# Patient Record
Sex: MALE | Race: WHITE | Employment: FULL TIME | URBAN - METROPOLITAN AREA
[De-identification: names, ages, dates, MRNs, and addresses within clinical notes are randomized per-mention and may not be internally consistent; named-entity substitution may affect disease eponyms.]

---

## 2021-01-19 ENCOUNTER — HOSPITAL ENCOUNTER (EMERGENCY)
Age: 46
Discharge: HOME | End: 2021-01-19
Attending: EMERGENCY MEDICINE
Payer: COMMERCIAL

## 2021-01-19 VITALS
BODY MASS INDEX: 47.74 KG/M2 | WEIGHT: 315 LBS | RESPIRATION RATE: 18 BRPM | SYSTOLIC BLOOD PRESSURE: 195 MMHG | OXYGEN SATURATION: 100 % | DIASTOLIC BLOOD PRESSURE: 97 MMHG | HEART RATE: 89 BPM | TEMPERATURE: 97.3 F | HEIGHT: 68 IN

## 2021-01-19 DIAGNOSIS — K43.9 HERNIA OF ANTERIOR ABDOMINAL WALL: Primary | ICD-10-CM

## 2021-01-19 PROCEDURE — 99282 EMERGENCY DEPT VISIT SF MDM: CPT

## 2021-01-19 RX ORDER — OXYCODONE AND ACETAMINOPHEN 5; 325 MG/1; MG/1
2 TABLET ORAL
Status: DISCONTINUED | OUTPATIENT
Start: 2021-01-19 | End: 2021-01-19

## 2021-01-20 NOTE — ED PROVIDER NOTES
EMERGENCY DEPARTMENT HISTORY AND PHYSICAL EXAM     ------------------------------------------------------------------------------------------------------  Please note that this dictation was completed with [employer], the [employer] voice recognition software. Quite often unanticipated grammatical, syntax, homophones, and other interpretive errors are inadvertently transcribed by the computer software. Please disregard these errors. Please excuse any errors that have escaped final proofreading.  -----------------------------------------------------------------------------------------------------------------    Date: 1/19/2021  Patient Name: Franklin Ocampo    History of Presenting Illness     Chief Complaint   Patient presents with    Abdominal Pain     hernia is out on the left side, states that he tried to put it back with out success at home and is nowe in significant pain       History Provided By: Patient    HPI: Franklin Ocampo is a 39 y.o. male, with significant pmhx of HTN and previous surgical incision hernia, who presents via private vehicle to the ED with c/o LLQ pain from large previously known hernia from a previous surgical incision. Notes having increased pain since 4pm, denies excessive lifting of heavy objects. Notes the hernia comes in and out often and he can usually push it back in himself but notes a \"golf ball\" sized area that was hard and painful. Notes having normal BM earlier in the day and no n/v since 4pm.  Did not take any pain medication at home for sx. Pt also specifically denies any recent fevers, chills, CP, SOB, nausea, vomiting, diarrhea, changes in BM, urinary sxs, or headache. PCP: Kaiden Figueroa MD    Social Hx: denies tobacco, denies EtOH, denies Illicit Drugs     There are no other complaints, changes, or physical findings at this time.      No Known Allergies      Current Outpatient Medications   Medication Sig Dispense Refill    methylphenidate (RITALIN) 10 mg tablet Take  by mouth. Take two 10mg tablets a day.  diazepam (VALIUM) 5 mg tablet Take 1 Tab by mouth every eight (8) hours as needed (spasm). 20 Tab 0    trimethoprim-sulfamethoxazole (BACTRIM DS) 160-800 mg per tablet Take 2 Tabs by mouth two (2) times a day. 40 Tab 0    oxyCODONE-acetaminophen (PERCOCET) 5-325 mg per tablet Take 1 Tab by mouth every four (4) hours as needed for Pain. 30 Tab 0    HYDROcodone-acetaminophen (NORCO) 5-325 mg per tablet Take 1-2 Tabs by mouth every four (4) hours as needed for Pain. 40 Tab 0    ESCITALOPRAM OXALATE (LEXAPRO PO) Take 30 mg by mouth daily.  omeprazole (PRILOSEC) 20 mg capsule Take 20 mg by mouth daily.  LISINOPRIL PO Take 10 mg by mouth daily. Past History     Past Medical History:  Past Medical History:   Diagnosis Date    Anxiety     Depression     Dyspepsia and other specified disorders of function of stomach     GERD (gastroesophageal reflux disease)     Hypertension     Psychiatric disorder     social anxiety and depression    Psychotic disorder ([facility] [address]     Sleep apnea     Weight loss     w/loss of appetite, fatigue, indigestion & diarrhea       Past Surgical History:  Past Surgical History:   Procedure Laterality Date    HX CHOLECYSTECTOMY  4/26/12    LAPAROSCOPIC, POSSIBLE OPEN, CHOLECYSTECTOMY AND CHOLANGIOGRAM with supervision/interpretation of radiology    HX HERNIA REPAIR         Family History:  Family History   Problem Relation Age of Onset    Hypertension Mother     Hypertension Father     Cancer Maternal Grandmother     Cancer Maternal Grandfather     Cancer Paternal Grandmother        Social History:  Social History     Tobacco Use    Smoking status: Never Smoker    Smokeless tobacco: Never Used   Substance Use Topics    Alcohol use: Yes     Comment: rarely    Drug use: No       Allergies:  No Known Allergies      Review of Systems   Review of Systems   Constitutional: Negative for chills and fever.    HENT: Negative. Eyes: Negative. Respiratory: Negative for cough, chest tightness and shortness of breath. Cardiovascular: Negative for chest pain and leg swelling. Gastrointestinal: Positive for abdominal pain. Negative for diarrhea, nausea and vomiting. Endocrine: Negative. Genitourinary: Negative for difficulty urinating and dysuria. Musculoskeletal: Negative for myalgias. Skin: Negative. Neurological: Negative. Psychiatric/Behavioral: Negative. All other systems reviewed and are negative. Physical Exam   Physical Exam  Abdominal:      General: Bowel sounds are normal.      Tenderness: There is abdominal tenderness in the left lower quadrant. Hernia: A hernia is present. Hernia is present in the ventral area. Diagnostic Study Results     Labs -   No results found for this or any previous visit (from the past 12 hour(s)). Radiologic Studies -   No orders to display     CT Results  (Last 48 hours)    None        CXR Results  (Last 48 hours)    None            Medical Decision Making   I am the first provider for this patient. I reviewed the vital signs, available nursing notes, past medical history, past surgical history, family history and social history. Vital Signs-Reviewed the patient's vital signs. Patient Vitals for the past 12 hrs:   Temp Pulse Resp BP SpO2   01/19/21 2039 97.3 °F (36.3 °C) 89 18 (!) 195/97 100 %       Pulse Oximetry Analysis - 100% on RA normal    Records Reviewed/Interpretted: Nursing Notes from triage and Old Medical Records, noting previous general surgery visits for known recurrent ventral incisional hernia    Provider Notes (Medical Decision Making):     DDX:  Reducible vs incarcerated hernia    Plan:  Attempted manual reduction    Impression:  Reducible recurrent hernia    ED Course:   Initial assessment performed.  The patients presenting problems have been discussed, and they are in agreement with the care plan formulated and outlined with them. I have encouraged them to ask questions as they arise throughout their visit. I reviewed our electronic medical record system for any past medical records that were available that may contribute to the patients current condition, the nursing notes and and vital signs from today's visit  Nursing notes will be reviewed as they become available in realtime while the pt has been in the ED. Sabrina Pozo MD    Procedure Note - Hernia Reduction:   9:40 PM  Performed by: Sabrina Pozo MD  Verbal consent obtained. Hernia to LLQ. Pt placed in the supine, trendelenburg  position. Hernia  was successfully reduced. No blood loss. No specimens removed. Number of attempts: 1    The procedure took 1-15 minutes, and pt tolerated well. 9:40 PM    Progress note:  Pt noted to be feeling better, ready for discharge. Pt will follow up with general surgery as instructed. All questions have been answered, pt voiced understanding and agreement with plan. Specific return precautions provided in addition to instructions for pt to return to the ED immediately should sx worsen at any time. Sabrina Pozo MD             Critical Care Time:     none      Diagnosis     Clinical Impression:   1. Hernia of anterior abdominal wall        PLAN:  1. Current Discharge Medication List        2.    Follow-up Information     Follow up With Specialties Details Why Contact Info    Jamie Mansfield MD Internal Medicine Schedule an appointment as soon as possible for a visit  As needed [address] [facility] [place] [address]      Olga Millard MD General Surgery, Breast Surgery, Endocrinology, Colon and Rectal Surgery In 2 days  [address]  [phone]      [facility] EMERGENCY DEPT Emergency Medicine  As needed, If symptoms worsen [address]  [phone]        Return to ED if worse Disposition:    9:41 PM   The patient's results have been reviewed with family and/or caregiver. They verbally convey their understanding and agreement of the patient's signs, symptoms, diagnosis, treatment and prognosis and additionally agree to follow up as recommended in the discharge instructions or to return to the Emergency Room should the patient's condition change prior to their follow-up appointment. The family and/or caregiver verbally agrees with the care-plan and all of their questions have been answered. The discharge instructions have also been provided to the them with educational information regarding the patient's diagnosis as well a list of reasons why the patient would want to return to the ER prior to their follow-up appointment should their condition change.   Sheryle Keepers, MD

## 2021-01-20 NOTE — ED NOTES
Pt discharged by [facility]. Pt provided with discharge instructions RX and instructions on follow up care. Pt out of ED with no apparent difficulty accompanied by RN.

## 2021-06-14 ENCOUNTER — HOSPITAL ENCOUNTER (EMERGENCY)
Age: 46
Discharge: HOME | End: 2021-06-14
Attending: EMERGENCY MEDICINE
Payer: COMMERCIAL

## 2021-06-14 ENCOUNTER — APPOINTMENT (OUTPATIENT)
Dept: CT IMAGING | Age: 46
End: 2021-06-14
Attending: EMERGENCY MEDICINE
Payer: COMMERCIAL

## 2021-06-14 VITALS
SYSTOLIC BLOOD PRESSURE: 127 MMHG | TEMPERATURE: 98.6 F | HEIGHT: 68 IN | BODY MASS INDEX: 47.74 KG/M2 | DIASTOLIC BLOOD PRESSURE: 75 MMHG | WEIGHT: 315 LBS | OXYGEN SATURATION: 96 % | HEART RATE: 84 BPM | RESPIRATION RATE: 20 BRPM

## 2021-06-14 DIAGNOSIS — K43.9 VENTRAL HERNIA WITHOUT OBSTRUCTION OR GANGRENE: Primary | ICD-10-CM

## 2021-06-14 LAB
ALBUMIN SERPL-MCNC: 3.4 G/DL (ref 3.5–5)
ALBUMIN/GLOB SERPL: 0.7 {RATIO} (ref 1.1–2.2)
ALP SERPL-CCNC: 74 U/L (ref 45–117)
ALT SERPL-CCNC: 37 U/L (ref 12–78)
ANION GAP SERPL CALC-SCNC: 5 MMOL/L (ref 5–15)
AST SERPL-CCNC: 16 U/L (ref 15–37)
BASOPHILS # BLD: 0 K/UL (ref 0–0.1)
BASOPHILS NFR BLD: 0 % (ref 0–1)
BILIRUB SERPL-MCNC: 0.4 MG/DL (ref 0.2–1)
BUN SERPL-MCNC: 11 MG/DL (ref 6–20)
BUN/CREAT SERPL: 10 (ref 12–20)
CALCIUM SERPL-MCNC: 9.2 MG/DL (ref 8.5–10.1)
CHLORIDE SERPL-SCNC: 104 MMOL/L (ref 97–108)
CO2 SERPL-SCNC: 27 MMOL/L (ref 21–32)
CREAT SERPL-MCNC: 1.12 MG/DL (ref 0.7–1.3)
DIFFERENTIAL METHOD BLD: ABNORMAL
EOSINOPHIL # BLD: 0.1 K/UL (ref 0–0.4)
EOSINOPHIL NFR BLD: 1 % (ref 0–7)
ERYTHROCYTE [DISTWIDTH] IN BLOOD BY AUTOMATED COUNT: 14.2 % (ref 11.5–14.5)
GLOBULIN SER CALC-MCNC: 4.7 G/DL (ref 2–4)
GLUCOSE SERPL-MCNC: 109 MG/DL (ref 65–100)
HCT VFR BLD AUTO: 42.2 % (ref 36.6–50.3)
HGB BLD-MCNC: 13.8 G/DL (ref 12.1–17)
IMM GRANULOCYTES # BLD AUTO: 0.1 K/UL (ref 0–0.04)
IMM GRANULOCYTES NFR BLD AUTO: 1 % (ref 0–0.5)
LACTATE BLD-SCNC: 1.6 MMOL/L (ref 0.4–2)
LACTATE BLD-SCNC: 2.27 MMOL/L (ref 0.4–2)
LYMPHOCYTES # BLD: 2.9 K/UL (ref 0.8–3.5)
LYMPHOCYTES NFR BLD: 22 % (ref 12–49)
MCH RBC QN AUTO: 27.2 PG (ref 26–34)
MCHC RBC AUTO-ENTMCNC: 32.7 G/DL (ref 30–36.5)
MCV RBC AUTO: 83.2 FL (ref 80–99)
MONOCYTES # BLD: 1 K/UL (ref 0–1)
MONOCYTES NFR BLD: 7 % (ref 5–13)
NEUTS SEG # BLD: 9.5 K/UL (ref 1.8–8)
NEUTS SEG NFR BLD: 69 % (ref 32–75)
NRBC # BLD: 0 K/UL (ref 0–0.01)
NRBC BLD-RTO: 0 PER 100 WBC
PLATELET # BLD AUTO: 382 K/UL (ref 150–400)
PMV BLD AUTO: 9.5 FL (ref 8.9–12.9)
POTASSIUM SERPL-SCNC: 4.1 MMOL/L (ref 3.5–5.1)
PROT SERPL-MCNC: 8.1 G/DL (ref 6.4–8.2)
RBC # BLD AUTO: 5.07 M/UL (ref 4.1–5.7)
SODIUM SERPL-SCNC: 136 MMOL/L (ref 136–145)
WBC # BLD AUTO: 13.5 K/UL (ref 4.1–11.1)

## 2021-06-14 PROCEDURE — 36415 COLL VENOUS BLD VENIPUNCTURE: CPT

## 2021-06-14 PROCEDURE — 74011000258 HC RX REV CODE- 258: Performed by: EMERGENCY MEDICINE

## 2021-06-14 PROCEDURE — 74011250636 HC RX REV CODE- 250/636: Performed by: EMERGENCY MEDICINE

## 2021-06-14 PROCEDURE — 96361 HYDRATE IV INFUSION ADD-ON: CPT

## 2021-06-14 PROCEDURE — 96365 THER/PROPH/DIAG IV INF INIT: CPT

## 2021-06-14 PROCEDURE — 85025 COMPLETE CBC W/AUTO DIFF WBC: CPT

## 2021-06-14 PROCEDURE — 80053 COMPREHEN METABOLIC PANEL: CPT

## 2021-06-14 PROCEDURE — 83605 ASSAY OF LACTIC ACID: CPT

## 2021-06-14 PROCEDURE — 74177 CT ABD & PELVIS W/CONTRAST: CPT

## 2021-06-14 PROCEDURE — 87040 BLOOD CULTURE FOR BACTERIA: CPT

## 2021-06-14 PROCEDURE — 74011000636 HC RX REV CODE- 636: Performed by: EMERGENCY MEDICINE

## 2021-06-14 PROCEDURE — 99284 EMERGENCY DEPT VISIT MOD MDM: CPT

## 2021-06-14 PROCEDURE — 99203 OFFICE O/P NEW LOW 30 MIN: CPT | Performed by: SURGERY

## 2021-06-14 RX ADMIN — PIPERACILLIN AND TAZOBACTAM 3.38 G: 3; .375 INJECTION, POWDER, LYOPHILIZED, FOR SOLUTION INTRAVENOUS at 18:56

## 2021-06-14 RX ADMIN — SODIUM CHLORIDE 1000 ML: 9 INJECTION, SOLUTION INTRAVENOUS at 19:00

## 2021-06-14 RX ADMIN — IOPAMIDOL 100 ML: 755 INJECTION, SOLUTION INTRAVENOUS at 18:47

## 2021-06-14 RX ADMIN — IOHEXOL 50 ML: 240 INJECTION, SOLUTION INTRATHECAL; INTRAVASCULAR; INTRAVENOUS; ORAL at 17:01

## 2021-06-14 NOTE — ED PROVIDER NOTES
EMERGENCY DEPARTMENT HISTORY AND PHYSICAL EXAM      Date: 6/14/2021  Patient Name: Jeff Lake  Patient Age and Sex: 39 y.o. male     History of Presenting Illness     Chief Complaint   Patient presents with    Abdominal Pain     pt with known hernia in LLQ complains of tenderness to palpation and heat in that area as well as not wanting to eat due to it increasing pain x 3 days. pcp sent him over with concerns for incarceration/strangulation of hernia       History Provided By: Patient    HPI: Jeff Lake is a 79-year-old male with a history of ventral hernia presenting for abdominal pain. Patient states that he has been dealing with this ventral hernia for many years now, however 3 days ago started having terrible pain at his hernia site. Today it got so bad that it is very very sensitive to just light touch. Associated with some diarrhea but denies any constipation, nausea or vomiting. Had a fever of 101. Went to see his primary care doctor who sent him here. There are no other complaints, changes, or physical findings at this time. PCP: Shay Mcarthur MD    No current facility-administered medications on file prior to encounter. Current Outpatient Medications on File Prior to Encounter   Medication Sig Dispense Refill    methylphenidate (RITALIN) 10 mg tablet Take  by mouth. Take two 10mg tablets a day.  diazepam (VALIUM) 5 mg tablet Take 1 Tab by mouth every eight (8) hours as needed (spasm). 20 Tab 0    trimethoprim-sulfamethoxazole (BACTRIM DS) 160-800 mg per tablet Take 2 Tabs by mouth two (2) times a day. 40 Tab 0    oxyCODONE-acetaminophen (PERCOCET) 5-325 mg per tablet Take 1 Tab by mouth every four (4) hours as needed for Pain. 30 Tab 0    HYDROcodone-acetaminophen (NORCO) 5-325 mg per tablet Take 1-2 Tabs by mouth every four (4) hours as needed for Pain. 40 Tab 0    ESCITALOPRAM OXALATE (LEXAPRO PO) Take 30 mg by mouth daily.       omeprazole (PRILOSEC) 20 mg capsule Take 20 mg by mouth daily.  LISINOPRIL PO Take 10 mg by mouth daily. Past History     Past Medical History:  Past Medical History:   Diagnosis Date    Anxiety     Depression     Dyspepsia and other specified disorders of function of stomach     GERD (gastroesophageal reflux disease)     Hypertension     Psychiatric disorder     social anxiety and depression    Psychotic disorder ([facility] [address]     Sleep apnea     Weight loss     w/loss of appetite, fatigue, indigestion & diarrhea       Past Surgical History:  Past Surgical History:   Procedure Laterality Date    HX CHOLECYSTECTOMY  4/26/12    LAPAROSCOPIC, POSSIBLE OPEN, CHOLECYSTECTOMY AND CHOLANGIOGRAM with supervision/interpretation of radiology    HX HERNIA REPAIR         Family History:  Family History   Problem Relation Age of Onset    Hypertension Mother     Hypertension Father     Cancer Maternal Grandmother     Cancer Maternal Grandfather     Cancer Paternal Grandmother        Social History:  Social History     Tobacco Use    Smoking status: Never Smoker    Smokeless tobacco: Never Used   Substance Use Topics    Alcohol use: Yes     Comment: rarely    Drug use: No       Allergies:  No Known Allergies      Review of Systems   Review of Systems   Constitutional: Positive for fever. Negative for chills. Respiratory: Negative for cough and shortness of breath. Cardiovascular: Negative for chest pain. Gastrointestinal: Positive for abdominal pain and diarrhea. Negative for constipation, nausea and vomiting. Genitourinary: Negative for dysuria, frequency and hematuria. Neurological: Negative for weakness and numbness. All other systems reviewed and are negative. Physical Exam   Physical Exam  Vitals and nursing note reviewed. Constitutional:       Appearance: He is well-developed. HENT:      Head: Normocephalic and atraumatic.       Nose: Nose normal.      Mouth/Throat:      Mouth: Mucous membranes are moist. Eyes:      Extraocular Movements: Extraocular movements intact. Conjunctiva/sclera: Conjunctivae normal.   Cardiovascular:      Rate and Rhythm: Normal rate and regular rhythm. Pulmonary:      Effort: Pulmonary effort is normal. No respiratory distress. Breath sounds: Normal breath sounds. Abdominal:      General: There is no distension. Palpations: Abdomen is soft. Tenderness: There is abdominal tenderness. Hernia: A hernia is present. Hernia is present in the ventral area. Comments: Patient has a significant left-sided ventral hernia that is exquisitely tender. Not able to be reduced. Musculoskeletal:         General: Normal range of motion. Cervical back: Normal range of motion and neck supple. Skin:     General: Skin is warm and dry. Neurological:      General: No focal deficit present. Mental Status: He is alert and oriented to person, place, and time. Mental status is at baseline. Psychiatric:         Mood and Affect: Mood normal.          Diagnostic Study Results     Labs -     No results found for this or any previous visit (from the past 12 hour(s)). Radiologic Studies -   CT ABD PELV W CONT   Final Result    Large infraumbilical ventral hernia containing bowel. No bowel obstruction. No   evidence of incarceration. Other ventral hernias as noted above. Marked hepatic steatosis. .              CT Results  (Last 48 hours)               06/14/21 1847  CT ABD PELV W CONT Final result    Impression:   Large infraumbilical ventral hernia containing bowel. No bowel obstruction. No   evidence of incarceration. Other ventral hernias as noted above. Marked hepatic steatosis. .               Narrative:  EXAMINATION:  CT ABD PELV W CONT   INDICATION:  LLQ pain and hernia. Concern incarceration   COMPARISON: CT of 4/8/2012. CONTRAST: 100 mL of Isovue-370.        TECHNIQUE:    Multislice helical CT was performed from the diaphragm to the symphysis pubis during uneventful rapid bolus intravenous contrast administration. Oral contrast   was administered. Axial images were acquired. Lung and soft tissue windows were   generated. Coronal and sagittal reformations were generated. CT dose reduction   was achieved through use of a standardized protocol tailored for this   examination and automatic exposure control for dose modulation. FINDINGS:   LOWER CHEST: The visualized portions of the lung bases are clear. ABDOMEN:   Liver: Hepatic steatosis. No focal lesion. Gallbladder and bile ducts: Previous cholecystectomy. No biliary dilatation. Spleen: No abnormality. Pancreas: No abnormality. Adrenal glands: No abnormality. Kidneys: No abnormality. BOWEL AND MESENTERY: Stomach appears unremarkable. No small bowel dilatation or   wall thickening. No significant colonic abnormalities. No mesenteric mass or   adenopathy. Appendix is nondistended. PERITONEUM: No ascites or free intraperitoneal air. RETROPERITONEUM: Aorta  tapers without aneurysm. No retroperitoneal adenopathy   or mass. No pelvic mass or adenopathy. PELVIS:   Reproductive organs:  Unremarkable. Bladder: No abnormality. BONES AND SOFT TISSUES: Large infraumbilical broad-based ventral hernia with   separation of the rectus muscles and protrusion of multiple loops of small   bowel. No evidence of bowel obstruction. Smaller fat-containing midline   epigastric ventral hernias. Generalized diastasIs of the rectus muscles. Calcified central disc herniation L5-S1. CXR Results  (Last 48 hours)    None            Medical Decision Making   I am the first provider for this patient. I reviewed the vital signs, available nursing notes, past medical history, past surgical history, family history and social history. Vital Signs-Reviewed the patient's vital signs. No data found.     Records Reviewed: Nursing Notes and Old Medical Records    Provider Notes (Medical Decision Making):   Patient presenting with left ventral abdominal hernia with significant pain. Differential includes small bowel obstruction, incarcerated versus strangulated hernia, hernia uncomplicated. Will get labs, CT of the abdomen treat his symptoms. ED Course:   Initial assessment performed. The patients presenting problems have been discussed, and they are in agreement with the care plan formulated and outlined with them. I have encouraged them to ask questions as they arise throughout their visit. ED Course as of Madhav 15 0956   Mon Jun 14, 2021   1817 There was an error with the POC machine causing an erroneous lactate of 2.27. Repeated the lactate right away and it was 1.60. At this time patient does not meet criteria for severe sepsis. [JS]   2000 -------------------------Signout----------------------------  I took over care of this patient at [facility] MD Dr. Lolis Matamoros saw and evaluated the patient and plans to give the patient an abdominal binder. He is not concerned about incarcerated hernia or any severe infection. The patient will have the abdominal binder, and be discharged follow-up with Dr. Patricia James in the office. ----------------------------------------------------------------          [NW]      ED Course User Index  [JS] Sherita Bain MD  [NW] Leigh Garcia MD     Critical Care Time:   0      Disposition:    Discharge Note:  The patient has been re-evaluated and is ready for discharge. Reviewed available results with patient. Counseled patient on diagnosis and care plan. Patient has expressed understanding, and all questions have been answered. Patient agrees with plan and agrees to follow up as recommended, or to return to the ED if their symptoms worsen. Discharge instructions have been provided and explained to the patient, along with reasons to return to the ED.       PLAN:  Discharge Medication List as of 6/14/2021  8:47 PM      1.   2.   Follow-up Information     Follow up With Specialties Details Why Contact Info    Kristie Garcia MD General Surgery, Breast Surgery, Endocrinology, Colon and Rectal Surgery   [address] [address] [address]  [phone]          3. Return to ED if worse   . Diagnosis     Clinical Impression:   1. Ventral hernia without obstruction or gangrene        Attestations:  Kendra Snyder M.D. Please note that this dictation was completed with [employer] the [employer] voice recognition software. Quite often unanticipated grammatical, syntax, homophones, and other interpretive errors are inadvertently transcribed by the computer software. Please disregard these errors. Please excuse any errors that have escaped final proofreading. Thank you.

## 2021-06-15 NOTE — CONSULTS
Surgery Consult    Subjective:      Thais Burgess is a 39 y.o. male who presents for evaluation of painful persistent left sided infraumbilical ventral incisional hernia. Pt saw his PCP today who directed him to the ED for further evaluation. Pt denies any nausea, vomiting, or change in bowel habits. He had a fever of 101 but is afebrile presently. Pt underwent laparoscopic cholecystectomy with Dr. Anitha Newell in 2012 and developed a ventral incisional hernia, s/p lap incisional hernia repair in Feb 2014 also by Dr. Anitha Newell. He saw him back in August, 2015 with recurrent hernias and was told to lose at least 25 pounds and f/u after for elective repair. He has not followed up and has gained more weight. He now has a BMI of 52.5 based on his stated weight. Past Medical History:   Diagnosis Date    Anxiety     Depression     Dyspepsia and other specified disorders of function of stomach     GERD (gastroesophageal reflux disease)     Hypertension     Psychiatric disorder     social anxiety and depression    Psychotic disorder ([address]     Sleep apnea     Weight loss     w/loss of appetite, fatigue, indigestion & diarrhea     Past Surgical History:   Procedure Laterality Date    HX CHOLECYSTECTOMY  4/26/12    LAPAROSCOPIC, POSSIBLE OPEN, CHOLECYSTECTOMY AND CHOLANGIOGRAM with supervision/interpretation of radiology    HX HERNIA REPAIR        Family History   Problem Relation Age of Onset    Hypertension Mother     Hypertension Father     Cancer Maternal Grandmother     Cancer Maternal Grandfather     Cancer Paternal Grandmother      Social History     Tobacco Use    Smoking status: Never Smoker    Smokeless tobacco: Never Used   Substance Use Topics    Alcohol use: Yes     Comment: rarely      Prior to Admission medications    Medication Sig Start Date End Date Taking? Authorizing Provider   methylphenidate (RITALIN) 10 mg tablet Take  by mouth. Take two 10mg tablets a day.     Provider, Historical   diazepam (VALIUM) 5 mg tablet Take 1 Tab by mouth every eight (8) hours as needed (spasm). 3/8/14   Geri Uriarte MD   trimethoprim-sulfamethoxazole (BACTRIM DS) 160-800 mg per tablet Take 2 Tabs by mouth two (2) times a day. 3/8/14   Geri Uriarte MD   oxyCODONE-acetaminophen (PERCOCET) 5-325 mg per tablet Take 1 Tab by mouth every four (4) hours as needed for Pain. 14   Romana Auguste MD   HYDROcodone-acetaminophen [facility]) 5-325 mg per tablet Take 1-2 Tabs by mouth every four (4) hours as needed for Pain. 14   Diann Boothe MD   ESCITALOPRAM OXALATE (LEXAPRO PO) Take 30 mg by mouth daily. Provider, Historical   omeprazole (PRILOSEC) 20 mg capsule Take 20 mg by mouth daily. Provider, Historical   LISINOPRIL PO Take 10 mg by mouth daily. Provider, Historical      No Known Allergies    Review of Systems   Constitutional: Positive for appetite change and fever. Negative for chills and diaphoresis. Respiratory: Negative for shortness of breath and wheezing. Cardiovascular: Negative for chest pain and palpitations. Gastrointestinal: Positive for abdominal pain. Negative for diarrhea, nausea and vomiting. Musculoskeletal: Negative for myalgias. Hematological: Does not bruise/bleed easily. All other systems reviewed and are negative. Objective:     Patient Vitals for the past 8 hrs:   BP Temp Pulse Resp SpO2 Height Weight   21 1945 127/75  84  97 %     21 1641 (!) 185/107 99.5 °F (37.5 °C) 100 20 94 % 5' 8\" (1.727 m) 345 lb 10.9 oz (156.8 kg)       Temp (24hrs), Av.5 °F (37.5 °C), Min:99.5 °F (37.5 °C), Max:99.5 °F (37.5 °C)      Physical Exam  Constitutional:       General: He is not in acute distress. Appearance: He is well-developed. He is obese. He is not toxic-appearing. HENT:      Head: Normocephalic and atraumatic. Eyes:      General: No scleral icterus. Pupils: Pupils are equal, round, and reactive to light. Cardiovascular:      Rate and Rhythm: Normal rate and regular rhythm. Heart sounds: Normal heart sounds. Pulmonary:      Breath sounds: Normal breath sounds. No wheezing or rales. Abdominal:      General: Bowel sounds are normal. There is no distension. Palpations: Abdomen is soft. There is no mass. Tenderness: There is abdominal tenderness in the left lower quadrant. There is no guarding or rebound. Hernia: A hernia is present. Hernia is present in the ventral area. Musculoskeletal:         General: Normal range of motion. Lymphadenopathy:      Cervical: No cervical adenopathy. Neurological:      General: No focal deficit present. Mental Status: He is alert and oriented to person, place, and time. Assessment:     38 yo super morbidly obese man with ventral hernia x several years, increasing in size and symptoms. Unsuccessful at losing weight and now at increased risk for strangulation and necessity for bowel resection. No emergent findings on CT or exam.  Bowel present in hernia which is wide mouthed without evidence of strangulation or ischemia. Plan:     Pt given abdominal binder. Instructed to use 24/7 and f/u with Dr. Dallas Haji in the office to schedule robotic ventral hernia repair with mesh.

## 2021-06-19 LAB
BACTERIA SPEC CULT: NORMAL
SERVICE CMNT-IMP: NORMAL

## 2021-06-21 ENCOUNTER — OFFICE VISIT (OUTPATIENT)
Dept: SURGERY | Age: 46
End: 2021-06-21
Payer: COMMERCIAL

## 2021-06-21 VITALS
WEIGHT: 315 LBS | BODY MASS INDEX: 47.74 KG/M2 | HEIGHT: 68 IN | HEART RATE: 62 BPM | SYSTOLIC BLOOD PRESSURE: 139 MMHG | TEMPERATURE: 98.1 F | DIASTOLIC BLOOD PRESSURE: 85 MMHG | RESPIRATION RATE: 20 BRPM | OXYGEN SATURATION: 100 %

## 2021-06-21 DIAGNOSIS — K43.2 RECURRENT INCISIONAL HERNIA: Primary | ICD-10-CM

## 2021-06-21 DIAGNOSIS — E66.01 MORBID OBESITY (HCC): Chronic | ICD-10-CM

## 2021-06-21 DIAGNOSIS — I10 ESSENTIAL HYPERTENSION: ICD-10-CM

## 2021-06-21 PROCEDURE — 99214 OFFICE O/P EST MOD 30 MIN: CPT | Performed by: SURGERY

## 2021-06-21 RX ORDER — ALLOPURINOL 100 MG/1
TABLET ORAL
COMMUNITY
Start: 2021-06-01

## 2021-06-21 RX ORDER — QUETIAPINE FUMARATE 25 MG/1
TABLET, FILM COATED ORAL
COMMUNITY
Start: 2021-04-30

## 2021-06-21 NOTE — PROGRESS NOTES
Meliton Anderson is a 39 y.o. male  HIPAA verified by two patient identifiers. Health Maintenance Due   Topic    Hepatitis C Screening     DTaP/Tdap/Td series (1 - Tdap)    Lipid Screen     COVID-19 Vaccine (2 - Pfizer 2-dose series)     Chief Complaint   Patient presents with    Possible Hernia     Seen at [facility] for eval of abd hernia      Visit Vitals  /85   Pulse 62   Temp 98.1 °F (36.7 °C) (Temporal)   Resp 20   Ht 5' 8\" (1.727 m)   Wt (!) 158.9 kg (350 lb 6.4 oz)   SpO2 100%   BMI 53.28 kg/m²       Pain Scale: 4/10  Pain Location: Abdomen (left side dull pain)    1. Have you been to the ER, urgent care clinic since your last visit? Hospitalized since your last visit? No    2. Have you seen or consulted any other health care providers outside of the [address] [place] [address] since your last visit? Include any pap smears or colon screening.  No

## 2021-06-21 NOTE — PROGRESS NOTES
HISTORY OF PRESENT ILLNESS  Jamilah Shaikh is a 39 y.o. male who comes in for consultation by Ricardo Carson MD and ER for a recurrent incisional hernia  HPI  He has gone to the ER twice in the last 3 months for abdominal pain related to a hernia. He has difficulty reducing it when he goes at times. In the ER it has been reducible. CTs noted multiple defects. He reports pain with it and diarrhea. He denies associated nausea, vomiting, constipation, melena, hematochezia. He previously had lap edson and an incisional hernia repair with mesh (2014 8 x 10 inch mesh). Past Medical History:   Diagnosis Date    Anxiety     Depression     Dyspepsia and other specified disorders of function of stomach     GERD (gastroesophageal reflux disease)     Hypertension     Psychiatric disorder     social anxiety and depression    Psychotic disorder ([address]     Sleep apnea     Weight loss     w/loss of appetite, fatigue, indigestion & diarrhea     Past Surgical History:   Procedure Laterality Date    HX CHOLECYSTECTOMY  4/26/12    LAPAROSCOPIC, POSSIBLE OPEN, CHOLECYSTECTOMY AND CHOLANGIOGRAM with supervision/interpretation of radiology    HX HERNIA REPAIR       Family History   Problem Relation Age of Onset    Hypertension Mother     Hypertension Father     Cancer Maternal Grandmother     Cancer Maternal Grandfather     Cancer Paternal Grandmother      Social History     Tobacco Use    Smoking status: Never Smoker    Smokeless tobacco: Never Used   Substance Use Topics    Alcohol use: Yes     Comment: rarely    Drug use: No     Current Outpatient Medications   Medication Sig    allopurinoL (ZYLOPRIM) 100 mg tablet TAKE 1 TABLET BY MOUTH EVERY DAY    QUEtiapine (SEROquel) 25 mg tablet     ESCITALOPRAM OXALATE (LEXAPRO PO) Take 30 mg by mouth daily.  omeprazole (PRILOSEC) 20 mg capsule Take 20 mg by mouth daily.  LISINOPRIL PO Take 10 mg by mouth daily.      No current facility-administered medications for this visit. No Known Allergies    Review of Systems   Constitutional: Negative for chills, diaphoresis, fever, malaise/fatigue and weight loss. HENT: Negative for congestion, ear pain and sore throat. Eyes: Negative for blurred vision and pain. Respiratory: Negative for cough, hemoptysis, sputum production, shortness of breath, wheezing and stridor. Cardiovascular: Negative for chest pain, palpitations, orthopnea, claudication, leg swelling and PND. Gastrointestinal: Positive for abdominal pain and diarrhea. Negative for blood in stool, constipation, heartburn, melena, nausea and vomiting. Genitourinary: Negative for dysuria, flank pain, frequency, hematuria and urgency. Musculoskeletal: Negative for back pain, joint pain, myalgias and neck pain. Skin: Negative for itching and rash. Neurological: Negative for dizziness, tremors, focal weakness, seizures, weakness and headaches. Endo/Heme/Allergies: Negative for polydipsia. Psychiatric/Behavioral: Positive for depression. Negative for memory loss. The patient is nervous/anxious. Visit Vitals  /85   Pulse 62   Temp 98.1 °F (36.7 °C) (Temporal)   Resp 20   Ht 5' 8\" (1.727 m)   Wt (!) 158.9 kg (350 lb 6.4 oz)   SpO2 100%   BMI 53.28 kg/m²       Physical Exam  Constitutional:       General: He is not in acute distress. Appearance: Normal appearance. He is well-developed. He is not diaphoretic. HENT:      Head: Normocephalic and atraumatic. Mouth/Throat:      Pharynx: No oropharyngeal exudate. Eyes:      General: No scleral icterus. Conjunctiva/sclera: Conjunctivae normal.      Pupils: Pupils are equal, round, and reactive to light. Neck:      Thyroid: No thyromegaly. Trachea: No tracheal deviation. Cardiovascular:      Rate and Rhythm: Normal rate and regular rhythm. Heart sounds: Normal heart sounds. No murmur heard. No friction rub. No gallop.     Pulmonary:      Effort: Pulmonary effort is normal. No respiratory distress. Breath sounds: Normal breath sounds. No stridor. No wheezing or rales. Abdominal:      General: Bowel sounds are normal. There is no distension. Palpations: Abdomen is soft. There is no mass. Tenderness: There is no abdominal tenderness. There is no guarding or rebound. Hernia: A hernia is present. Hernia is present in the ventral area (multiple reducible herniae). There is no hernia in the left inguinal area or right inguinal area. Genitourinary:     Penis: Circumcised. Testes: Normal. Cremasteric reflex is present. Musculoskeletal:         General: No tenderness. Normal range of motion. Cervical back: Normal range of motion and neck supple. Lymphadenopathy:      Cervical: No cervical adenopathy. Skin:     General: Skin is warm and dry. Findings: No erythema or rash. Neurological:      Mental Status: He is alert and oriented to person, place, and time. Cranial Nerves: No cranial nerve deficit. Coordination: Coordination normal.   Psychiatric:         Behavior: Behavior normal.         Thought Content: Thought content normal.         Judgment: Judgment normal.       I reviewed his CT images and he has a large diastasis and multiple defects    ASSESSMENT and PLAN  1. Recurrent incisional herniae and diastasis rectus. I explained about the anatomy and pathophysiology of hernias and the risk of incarceration and strangulation of the bowel. I explained about hernia repairs (open with and without mesh, and robotic assisted and laparoscopic with mesh). I explained the risks and benefits of repair including bleeding, infection, chronic pain, orchalgia, loss of testes, bowel or bladder injury, hernia recurrence, seroma, mesh infection requiring removal.  I explained it would be a six to eight week recuperation with no driving for 5 - 7 days, no lifting for six weeks. 2.  Morbid obesity.   Body mass index is 53.28 kg/m². Recommended diet modification and increased exercise  3. Anxiety/depression. Improved on rx  4. Essential hypertension. Stable on rx  5. Gout.   Asymptomatic on allopurinol    At this point he will need a recurrent incisional hernia repair with mesh and separation of components for the repair and has a high risk for failure given his body habitus    I have recommended losing 50 pounds (at least 27) prior to surgery  I have also suggested he consider weight loss surgery  I explained to him how to reduce the hernia and when he would need to go to the ER    He will RTC 3 months    Hector Wynn MD FACS

## 2021-06-21 NOTE — LETTER
6/21/2021    Patient: Mary Ray   YOB: 1975   Date of Visit: 6/21/2021     Crys Wray MD  [facility],[address] [place] [id number]  Via Fax: [phone]    Dear Crys Wray MD,      Thank you for referring Mr. Osiris Epps to  [facility]  for evaluation. My notes for this consultation are attached. If you have questions, please do not hesitate to call me. I look forward to following your patient along with you.       Sincerely,    Tabatha Tucker MD

## 2022-03-20 PROBLEM — I10 ESSENTIAL HYPERTENSION: Status: ACTIVE | Noted: 2021-06-21

## 2023-05-16 RX ORDER — QUETIAPINE FUMARATE 25 MG/1
TABLET, FILM COATED ORAL
COMMUNITY
Start: 2021-04-30

## 2023-05-16 RX ORDER — ALLOPURINOL 100 MG/1
1 TABLET ORAL DAILY
COMMUNITY
Start: 2021-06-01

## 2023-05-16 RX ORDER — OMEPRAZOLE 20 MG/1
20 CAPSULE, DELAYED RELEASE ORAL DAILY
COMMUNITY

## 2025-04-29 ENCOUNTER — TRANSCRIBE ORDERS (OUTPATIENT)
Facility: HOSPITAL | Age: 50
End: 2025-04-29

## 2025-04-29 DIAGNOSIS — R12 HEARTBURN: Primary | ICD-10-CM

## 2025-04-29 DIAGNOSIS — K21.9 GASTROESOPHAGEAL REFLUX DISEASE, UNSPECIFIED WHETHER ESOPHAGITIS PRESENT: ICD-10-CM

## 2025-04-29 DIAGNOSIS — K46.9 ABDOMINAL HERNIA WITHOUT OBSTRUCTION AND WITHOUT GANGRENE, RECURRENCE NOT SPECIFIED, UNSPECIFIED HERNIA TYPE: ICD-10-CM

## 2025-04-29 DIAGNOSIS — Z12.11 COLON CANCER SCREENING: ICD-10-CM

## 2025-05-14 ENCOUNTER — HOSPITAL ENCOUNTER (OUTPATIENT)
Facility: HOSPITAL | Age: 50
Discharge: HOME | End: 2025-05-17
Payer: COMMERCIAL

## 2025-05-14 DIAGNOSIS — K46.9 ABDOMINAL HERNIA WITHOUT OBSTRUCTION AND WITHOUT GANGRENE, RECURRENCE NOT SPECIFIED, UNSPECIFIED HERNIA TYPE: ICD-10-CM

## 2025-05-14 DIAGNOSIS — K21.9 GASTROESOPHAGEAL REFLUX DISEASE, UNSPECIFIED WHETHER ESOPHAGITIS PRESENT: ICD-10-CM

## 2025-05-14 DIAGNOSIS — Z12.11 COLON CANCER SCREENING: ICD-10-CM

## 2025-05-14 DIAGNOSIS — R12 HEARTBURN: ICD-10-CM

## 2025-05-14 PROCEDURE — 6360000004 HC RX CONTRAST MEDICATION: Performed by: RADIOLOGY

## 2025-05-14 PROCEDURE — 74177 CT ABD & PELVIS W/CONTRAST: CPT

## 2025-05-14 RX ORDER — IOPAMIDOL 755 MG/ML
100 INJECTION, SOLUTION INTRAVASCULAR
Status: COMPLETED | OUTPATIENT
Start: 2025-05-14 | End: 2025-05-14

## 2025-05-14 RX ADMIN — IOPAMIDOL 100 ML: 755 INJECTION, SOLUTION INTRAVENOUS at 06:15

## 2025-05-21 ENCOUNTER — TELEPHONE (OUTPATIENT)
Age: 50
End: 2025-05-21

## 2025-05-21 ENCOUNTER — OFFICE VISIT (OUTPATIENT)
Age: 50
End: 2025-05-21
Payer: COMMERCIAL

## 2025-05-21 VITALS
HEIGHT: 68 IN | HEART RATE: 58 BPM | OXYGEN SATURATION: 96 % | RESPIRATION RATE: 20 BRPM | WEIGHT: 315 LBS | SYSTOLIC BLOOD PRESSURE: 140 MMHG | TEMPERATURE: 98.9 F | DIASTOLIC BLOOD PRESSURE: 100 MMHG | BODY MASS INDEX: 47.74 KG/M2

## 2025-05-21 DIAGNOSIS — K43.2 RECURRENT INCISIONAL HERNIA: Primary | ICD-10-CM

## 2025-05-21 DIAGNOSIS — E66.01 MORBID OBESITY (HCC): ICD-10-CM

## 2025-05-21 PROCEDURE — G8427 DOCREV CUR MEDS BY ELIG CLIN: HCPCS | Performed by: SURGERY

## 2025-05-21 PROCEDURE — 3077F SYST BP >= 140 MM HG: CPT | Performed by: SURGERY

## 2025-05-21 PROCEDURE — 99203 OFFICE O/P NEW LOW 30 MIN: CPT | Performed by: SURGERY

## 2025-05-21 PROCEDURE — 4004F PT TOBACCO SCREEN RCVD TLK: CPT | Performed by: SURGERY

## 2025-05-21 PROCEDURE — G8417 CALC BMI ABV UP PARAM F/U: HCPCS | Performed by: SURGERY

## 2025-05-21 PROCEDURE — 3080F DIAST BP >= 90 MM HG: CPT | Performed by: SURGERY

## 2025-05-21 ASSESSMENT — PATIENT HEALTH QUESTIONNAIRE - PHQ9
SUM OF ALL RESPONSES TO PHQ QUESTIONS 1-9: 0
1. LITTLE INTEREST OR PLEASURE IN DOING THINGS: NOT AT ALL
2. FEELING DOWN, DEPRESSED OR HOPELESS: NOT AT ALL
SUM OF ALL RESPONSES TO PHQ QUESTIONS 1-9: 0

## 2025-05-21 NOTE — TELEPHONE ENCOUNTER
Patient has been sent the link for our pre-recorded [facility] Medical Weight Loss Orientation. Patient is required to register, view the recording, call insurance to verify benefits, then send an email to the  to schedule a consultation.

## 2025-05-21 NOTE — PROGRESS NOTES
Identified patient with two patient identifiers (name and ). Reviewed chart in preparation for visit and have obtained necessary documentation.    Attila Ramírez is a 49 y.o. male  Chief Complaint   Patient presents with    New Patient     Abdominal hernia     BP (!) 140/100 Comment: manual checked twice  Pulse 58   Temp 98.9 °F (37.2 °C) (Oral)   Resp 20   Ht 1.727 m (5' 8\")   Wt (!) 165.6 kg (365 lb)   SpO2 96%   BMI 55.50 kg/m²     1. Have you been to the ER, urgent care clinic since your last visit?  Hospitalized since your last visit?yes -     2. Have you seen or consulted any other health care providers outside of the [facility] since your last visit?  Include any pap smears or colon screening. No    Patient and provider made aware of elevated BP x2. Patient asymptomatic. Patient reminded to monitor BP, continue to take BP medications if prescribed, and follow up with PCP/Cardiologist.  Patient expressed understanding and agreement.

## 2025-05-21 NOTE — PROGRESS NOTES
[facility] [place] Surgery History and Physical    History of Present Illness:      Attila Ramírez is a 49 y.o. male who has a past surgical history of laparoscopic cholecystectomy then had a large hernia near the umbilicus which was then repaired by Dr. Jin laparoscopically with an 8 x 10 inch mesh.  He has had a recurrence after the repair and has a large hernia in the midline and upper midline area.  The hernia has become much bigger over the last few years.  He does not have much true pain from the hernia but sometimes has some discomfort.  He does find it hard to get around and sit up and use his core muscles.  He has worked on some weight loss without much success.    Past Medical History:   Diagnosis Date    Anxiety     Depression     Dyspepsia and other specified disorders of function of stomach     GERD (gastroesophageal reflux disease)     Hypertension     Psychiatric disorder     social anxiety and depression    Psychotic disorder (HCC)     Sleep apnea     Weight loss     w/loss of appetite, fatigue, indigestion & diarrhea       Past Surgical History:   Procedure Laterality Date    CHOLECYSTECTOMY  4/26/12    LAPAROSCOPIC, POSSIBLE OPEN, CHOLECYSTECTOMY AND CHOLANGIOGRAM with supervision/interpretation of radiology    HERNIA REPAIR           Current Outpatient Medications:     LISINOPRIL PO, Take 10 mg by mouth daily, Disp: , Rfl:     allopurinol (ZYLOPRIM) 100 MG tablet, Take 1 tablet by mouth daily, Disp: , Rfl:     omeprazole (PRILOSEC) 20 MG delayed release capsule, Take 1 capsule by mouth daily, Disp: , Rfl:     QUEtiapine (SEROQUEL) 25 MG tablet, ceived the following from [facility]: Outside name: QUEtiapine (SEROquel) 25 mg tablet, Disp: , Rfl:     No Known Allergies    Social History     Socioeconomic History    Marital status: Single     Spouse name: Not on file    Number of children: Not on file    Years of education: Not on file    Highest

## 2025-07-07 ENCOUNTER — HOSPITAL ENCOUNTER (OUTPATIENT)
Facility: HOSPITAL | Age: 50
Discharge: HOME | End: 2025-07-07
Attending: SURGERY
Payer: COMMERCIAL

## 2025-07-07 VITALS — TEMPERATURE: 99.8 F | HEART RATE: 52 BPM | RESPIRATION RATE: 18 BRPM

## 2025-07-07 DIAGNOSIS — S31.109A OPEN WOUND OF ABDOMEN, INITIAL ENCOUNTER: Primary | ICD-10-CM

## 2025-07-07 PROCEDURE — 99213 OFFICE O/P EST LOW 20 MIN: CPT

## 2025-07-07 PROCEDURE — 99214 OFFICE O/P EST MOD 30 MIN: CPT | Performed by: SURGERY

## 2025-07-07 RX ORDER — NEOMYCIN/BACITRACIN/POLYMYXINB 3.5-400-5K
OINTMENT (GRAM) TOPICAL PRN
Status: CANCELLED | OUTPATIENT
Start: 2025-07-07

## 2025-07-07 RX ORDER — GINSENG 100 MG
CAPSULE ORAL PRN
OUTPATIENT
Start: 2025-07-07

## 2025-07-07 RX ORDER — LIDOCAINE 40 MG/G
CREAM TOPICAL PRN
Status: CANCELLED | OUTPATIENT
Start: 2025-07-07

## 2025-07-07 RX ORDER — TRIAMCINOLONE ACETONIDE 1 MG/G
OINTMENT TOPICAL PRN
OUTPATIENT
Start: 2025-07-07

## 2025-07-07 RX ORDER — BETAMETHASONE DIPROPIONATE 0.5 MG/G
CREAM TOPICAL PRN
Status: CANCELLED | OUTPATIENT
Start: 2025-07-07

## 2025-07-07 RX ORDER — GENTAMICIN SULFATE 1 MG/G
OINTMENT TOPICAL PRN
Status: CANCELLED | OUTPATIENT
Start: 2025-07-07

## 2025-07-07 RX ORDER — CLOBETASOL PROPIONATE 0.5 MG/G
OINTMENT TOPICAL PRN
OUTPATIENT
Start: 2025-07-07

## 2025-07-07 RX ORDER — LIDOCAINE HYDROCHLORIDE 20 MG/ML
JELLY TOPICAL PRN
Status: CANCELLED | OUTPATIENT
Start: 2025-07-07

## 2025-07-07 RX ORDER — LIDOCAINE 50 MG/G
OINTMENT TOPICAL PRN
OUTPATIENT
Start: 2025-07-07

## 2025-07-07 RX ORDER — TRIAMCINOLONE ACETONIDE 1 MG/G
OINTMENT TOPICAL PRN
Status: CANCELLED | OUTPATIENT
Start: 2025-07-07

## 2025-07-07 RX ORDER — LIDOCAINE 50 MG/G
OINTMENT TOPICAL PRN
Status: CANCELLED | OUTPATIENT
Start: 2025-07-07

## 2025-07-07 RX ORDER — MUPIROCIN 2 %
OINTMENT (GRAM) TOPICAL PRN
Status: CANCELLED | OUTPATIENT
Start: 2025-07-07

## 2025-07-07 RX ORDER — BACITRACIN ZINC AND POLYMYXIN B SULFATE 500; 1000 [USP'U]/G; [USP'U]/G
OINTMENT TOPICAL PRN
OUTPATIENT
Start: 2025-07-07

## 2025-07-07 RX ORDER — GENTAMICIN SULFATE 1 MG/G
OINTMENT TOPICAL PRN
OUTPATIENT
Start: 2025-07-07

## 2025-07-07 RX ORDER — SILVER SULFADIAZINE 10 MG/G
CREAM TOPICAL PRN
OUTPATIENT
Start: 2025-07-07

## 2025-07-07 RX ORDER — BISOPROLOL FUMARATE 10 MG/1
10 TABLET, FILM COATED ORAL DAILY
COMMUNITY

## 2025-07-07 RX ORDER — SODIUM CHLOR/HYPOCHLOROUS ACID 0.033 %
SOLUTION, IRRIGATION IRRIGATION PRN
Status: CANCELLED | OUTPATIENT
Start: 2025-07-07

## 2025-07-07 RX ORDER — LIDOCAINE HYDROCHLORIDE 40 MG/ML
SOLUTION TOPICAL PRN
Status: CANCELLED | OUTPATIENT
Start: 2025-07-07

## 2025-07-07 RX ORDER — MUPIROCIN 2 %
OINTMENT (GRAM) TOPICAL PRN
OUTPATIENT
Start: 2025-07-07

## 2025-07-07 RX ORDER — GINSENG 100 MG
CAPSULE ORAL PRN
Status: CANCELLED | OUTPATIENT
Start: 2025-07-07

## 2025-07-07 RX ORDER — LIDOCAINE 40 MG/G
CREAM TOPICAL PRN
OUTPATIENT
Start: 2025-07-07

## 2025-07-07 RX ORDER — LIDOCAINE HYDROCHLORIDE 40 MG/ML
SOLUTION TOPICAL PRN
OUTPATIENT
Start: 2025-07-07

## 2025-07-07 RX ORDER — LIDOCAINE HYDROCHLORIDE 20 MG/ML
JELLY TOPICAL PRN
OUTPATIENT
Start: 2025-07-07

## 2025-07-07 RX ORDER — SILVER SULFADIAZINE 10 MG/G
CREAM TOPICAL PRN
Status: CANCELLED | OUTPATIENT
Start: 2025-07-07

## 2025-07-07 RX ORDER — SODIUM CHLOR/HYPOCHLOROUS ACID 0.033 %
SOLUTION, IRRIGATION IRRIGATION PRN
OUTPATIENT
Start: 2025-07-07

## 2025-07-07 RX ORDER — BACITRACIN ZINC AND POLYMYXIN B SULFATE 500; 1000 [USP'U]/G; [USP'U]/G
OINTMENT TOPICAL PRN
Status: CANCELLED | OUTPATIENT
Start: 2025-07-07

## 2025-07-07 RX ORDER — CLOBETASOL PROPIONATE 0.5 MG/G
OINTMENT TOPICAL PRN
Status: CANCELLED | OUTPATIENT
Start: 2025-07-07

## 2025-07-07 RX ORDER — NEOMYCIN/BACITRACIN/POLYMYXINB 3.5-400-5K
OINTMENT (GRAM) TOPICAL PRN
OUTPATIENT
Start: 2025-07-07

## 2025-07-07 RX ORDER — BETAMETHASONE DIPROPIONATE 0.5 MG/G
CREAM TOPICAL PRN
OUTPATIENT
Start: 2025-07-07

## 2025-07-07 ASSESSMENT — PAIN - FUNCTIONAL ASSESSMENT: PAIN_FUNCTIONAL_ASSESSMENT: ACTIVITIES ARE NOT PREVENTED

## 2025-07-07 ASSESSMENT — PAIN DESCRIPTION - LOCATION: LOCATION: ABDOMEN

## 2025-07-07 ASSESSMENT — PAIN DESCRIPTION - FREQUENCY: FREQUENCY: CONTINUOUS

## 2025-07-07 ASSESSMENT — PAIN SCALES - GENERAL: PAINLEVEL_OUTOF10: 3

## 2025-07-07 ASSESSMENT — PAIN DESCRIPTION - DESCRIPTORS: DESCRIPTORS: SORE

## 2025-07-07 ASSESSMENT — PAIN DESCRIPTION - ORIENTATION: ORIENTATION: LOWER;LEFT

## 2025-07-07 NOTE — PLAN OF CARE
Wound Care Supplies      Supply Company:     [employer]     Ordering Center:     [facility]  [address], [address]  [facility] [address] [phone]  WOUND CARE Dept: [phone]   FAX NUMBER [phone]    Patient Information:      Attila Ramírez  [address]  [place] [address] [phone]   : 1975  AGE: 49 y.o.     GENDER: male   EPISODE DATE: 2025    Insurance:      PRIMARY INSURANCE:  Plan: [employer]  Coverage: [employer] HE[employer]  Effective Date: 2025  Group Number: [unfilled]  Subscriber Number: [id number] - (Commercial)    Payer/Plan Subscr  Sex Relation Sub. Ins. ID Effective Group Num   1. Delivery Agent* ATTILA RAMÍREZ 1975 Male Self [id number]                                   [id number]       Patient Wound Information:      Problem List Items Addressed This Visit          Other    Open wound of abdomen - Primary    Relevant Orders    Initiate Outpatient Wound Care Protocol       ICD 10 Code: S31.109A    WOUNDS REQUIRING DRESSING SUPPLIES:     Wound 25 Abdomen Left;Lower (Active)   Wound Image   25 08   Wound Etiology Other 25 08   Dressing Status New dressing applied 25 09   Wound Cleansed Soap and water;Cleansed with saline 25 08   Dressing/Treatment Xeroform;ABD;Tape/Soft cloth adhesive tape 25 0907   Wound Length (cm) 3.7 cm 25 0833   Wound Width (cm) 6.3 cm 25 08   Wound Depth (cm) 0.1 cm 25 0833   Wound Surface Area (cm^2) 23.31 cm^2 25 0833   Wound Volume (cm^3) 2.331 cm^3 25 0833   Wound Assessment Pink/red;Other (Comment) 25 08   Drainage Amount Moderate (25-50%) 25 08   Drainage Description Serosanguinous 25   Odor None 25 08   Shayla-wound Assessment Fragile 25 0833   Margins Undefined edges 25 0833   Wound Thickness Description not for Pressure Injury Full thickness

## 2025-07-07 NOTE — PROGRESS NOTES
Wound care    The patient is a 49-year-old man who was referred to the wound care center regarding a wound on the left anterior abdomen in the area of a ventral hernia.    The patient was first seen at the wound care center on 7/7/2025.    The patient reports that he scratched his abdominal wall in the area of his left anterior abdominal ventral hernia and developed a wound.    The patient has history of morbid obesity.  He had laparoscopic cholecystectomy approximately 4 years ago and developed a periumbilical hernia.  He had hernia repair utilizing intraperitoneal overlay mesh.  The patient has had recurrence of the hernia since that time and has extensive diastases recti also.  The patient has been seen by Dr. Je Chaidez to evaluate for hernia repair.  The patient will need to lose 65 pounds prior to hernia repair.  He has been working on weight loss.    The patient does not have diabetes mellitus.  He does not report specific cardiac symptoms or have history of MI or coronary intervention.  He ambulates and does not describe dyspnea.    Past medical history is remarkable for hypertension.        Reported weight 365 pounds height 5 feet 8 inches    Physical examination    Vital signs pulse 52 temperature 99.8 respirations 18    The patient is an alert man in no acute distress.    Examination of the patient's abdomen revealed on the anterior left lower abdomen in an area of protuberance related to herniation a wound cluster with total dimensions 3.7 x 6.3 x 0.1 cm with scabbing and with pink surface.        Impression:    Open wound left lower abdominal wall overlying ventral hernia      Plan:    Dressing ordered: Cover wound with Xeroform then ABD and tape.  Change dressing daily.    Dressing can be removed for showering.      The patient will follow-up in the wound care center in 2 weeks.        Diagnosis open wound of left lower anterior abdominal wall, recurrent incisional hernia    S31.109A,

## 2025-07-07 NOTE — PATIENT INSTRUCTIONS
IT IS MOST IMPORTANT TO KEEP THE WOUND COVERED AT ALL TIMES.     Physician Signature:_______________________    Date: ___________ Time:  ____________

## 2025-07-07 NOTE — FLOWSHEET NOTE
07/07/25 0833   Wound 07/07/25 Abdomen Left;Lower   Date First Assessed/Time First Assessed: 07/07/25 0832   Present on Original Admission: Yes  Wound Approximate Age at First Assessment (Weeks): 12 weeks  Primary Wound Type: (c) Other (Comment)  Location: Abdomen  Wound Location Orientation: Left;Lower   Wound Image    Wound Etiology Other  (Traumatic injury to herniated abdominal region)   Dressing Status Old drainage noted   Wound Cleansed Soap and water;Cleansed with saline   Wound Length (cm) 3.7 cm   Wound Width (cm) 6.3 cm   Wound Depth (cm) 0.1 cm   Wound Surface Area (cm^2) 23.31 cm^2   Wound Volume (cm^3) 2.331 cm^3   Wound Assessment Pink/red;Other (Comment)  (Clustered wound w/red open area & scattered scabs)   Drainage Amount Moderate (25-50%)   Drainage Description Serosanguinous   Odor None   Shayla-wound Assessment Fragile   Margins Undefined edges  (Clustered wound)   Wound Thickness Description not for Pressure Injury Full thickness     Pulse 52   Temp 99.8 °F (37.7 °C) (Temporal)   Resp 18   PF (!) 2 L/min

## 2025-07-07 NOTE — FLOWSHEET NOTE
07/07/25 0907   Wound 07/07/25 Abdomen Left;Lower   Date First Assessed/Time First Assessed: 07/07/25 0832   Present on Original Admission: Yes  Wound Approximate Age at First Assessment (Weeks): 12 weeks  Primary Wound Type: (c) Other (Comment)  Location: Abdomen  Wound Location Orientation: Left;Lower   Dressing Status New dressing applied   Dressing/Treatment Xeroform;ABD;Tape/Soft cloth adhesive tape

## 2025-07-21 ENCOUNTER — HOSPITAL ENCOUNTER (OUTPATIENT)
Facility: HOSPITAL | Age: 50
Discharge: HOME | End: 2025-07-21
Attending: SURGERY
Payer: COMMERCIAL

## 2025-07-21 VITALS
DIASTOLIC BLOOD PRESSURE: 63 MMHG | TEMPERATURE: 98.3 F | SYSTOLIC BLOOD PRESSURE: 139 MMHG | RESPIRATION RATE: 18 BRPM | HEART RATE: 53 BPM

## 2025-07-21 DIAGNOSIS — S31.109A OPEN WOUND OF ABDOMEN, INITIAL ENCOUNTER: Primary | ICD-10-CM

## 2025-07-21 PROCEDURE — 99214 OFFICE O/P EST MOD 30 MIN: CPT | Performed by: SURGERY

## 2025-07-21 PROCEDURE — 87205 SMEAR GRAM STAIN: CPT

## 2025-07-21 PROCEDURE — 99213 OFFICE O/P EST LOW 20 MIN: CPT

## 2025-07-21 PROCEDURE — 87070 CULTURE OTHR SPECIMN AEROBIC: CPT

## 2025-07-21 RX ORDER — LIDOCAINE HYDROCHLORIDE 20 MG/ML
JELLY TOPICAL PRN
OUTPATIENT
Start: 2025-07-21

## 2025-07-21 RX ORDER — BACITRACIN ZINC AND POLYMYXIN B SULFATE 500; 1000 [USP'U]/G; [USP'U]/G
OINTMENT TOPICAL PRN
OUTPATIENT
Start: 2025-07-21

## 2025-07-21 RX ORDER — BETAMETHASONE DIPROPIONATE 0.5 MG/G
CREAM TOPICAL PRN
OUTPATIENT
Start: 2025-07-21

## 2025-07-21 RX ORDER — GENTAMICIN SULFATE 1 MG/G
OINTMENT TOPICAL PRN
OUTPATIENT
Start: 2025-07-21

## 2025-07-21 RX ORDER — TRIAMCINOLONE ACETONIDE 1 MG/G
OINTMENT TOPICAL PRN
OUTPATIENT
Start: 2025-07-21

## 2025-07-21 RX ORDER — LIDOCAINE 40 MG/G
CREAM TOPICAL PRN
OUTPATIENT
Start: 2025-07-21

## 2025-07-21 RX ORDER — LIDOCAINE 50 MG/G
OINTMENT TOPICAL PRN
OUTPATIENT
Start: 2025-07-21

## 2025-07-21 RX ORDER — SODIUM CHLOR/HYPOCHLOROUS ACID 0.033 %
SOLUTION, IRRIGATION IRRIGATION PRN
OUTPATIENT
Start: 2025-07-21

## 2025-07-21 RX ORDER — GINSENG 100 MG
CAPSULE ORAL PRN
OUTPATIENT
Start: 2025-07-21

## 2025-07-21 RX ORDER — NEOMYCIN/BACITRACIN/POLYMYXINB 3.5-400-5K
OINTMENT (GRAM) TOPICAL PRN
OUTPATIENT
Start: 2025-07-21

## 2025-07-21 RX ORDER — LIDOCAINE HYDROCHLORIDE 40 MG/ML
SOLUTION TOPICAL PRN
OUTPATIENT
Start: 2025-07-21

## 2025-07-21 RX ORDER — SILVER SULFADIAZINE 10 MG/G
CREAM TOPICAL PRN
OUTPATIENT
Start: 2025-07-21

## 2025-07-21 RX ORDER — CLOBETASOL PROPIONATE 0.5 MG/G
OINTMENT TOPICAL PRN
OUTPATIENT
Start: 2025-07-21

## 2025-07-21 RX ORDER — MUPIROCIN 2 %
OINTMENT (GRAM) TOPICAL PRN
OUTPATIENT
Start: 2025-07-21

## 2025-07-21 ASSESSMENT — PAIN SCALES - GENERAL: PAINLEVEL_OUTOF10: 6

## 2025-07-21 ASSESSMENT — PAIN DESCRIPTION - FREQUENCY: FREQUENCY: CONTINUOUS

## 2025-07-21 ASSESSMENT — PAIN - FUNCTIONAL ASSESSMENT: PAIN_FUNCTIONAL_ASSESSMENT: PREVENTS OR INTERFERES SOME ACTIVE ACTIVITIES AND ADLS

## 2025-07-21 ASSESSMENT — PAIN DESCRIPTION - DESCRIPTORS: DESCRIPTORS: SORE;ACHING

## 2025-07-21 ASSESSMENT — PAIN DESCRIPTION - LOCATION: LOCATION: ABDOMEN

## 2025-07-21 ASSESSMENT — PAIN DESCRIPTION - ORIENTATION: ORIENTATION: LEFT;LOWER

## 2025-07-21 NOTE — PATIENT INSTRUCTIONS
US. IT IS MOST IMPORTANT TO KEEP THE WOUND COVERED AT ALL TIMES.     Physician Signature:_______________________    Date: ___________ Time:  ____________

## 2025-07-21 NOTE — FLOWSHEET NOTE
07/21/25 0816   Wound 07/07/25 Abdomen Left;Lower   Date First Assessed/Time First Assessed: 07/07/25 0832   Present on Original Admission: Yes  Wound Approximate Age at First Assessment (Weeks): 12 weeks  Primary Wound Type: (c) Other (Comment)  Location: Abdomen  Wound Location Orientation: Left;Lower   Wound Image    Wound Etiology Other  (Traumatic injury to hernaited abdomen)   Dressing Status Old drainage noted   Wound Cleansed Soap and water;Cleansed with saline   Wound Length (cm) 3.5 cm   Wound Width (cm) 6.4 cm   Wound Depth (cm) 0.1 cm   Wound Surface Area (cm^2) 22.4 cm^2   Change in Wound Size % (l*w) 3.9   Wound Volume (cm^3) 2.24 cm^3   Wound Healing % 4   Wound Assessment Pink/red;Other (Comment);Purple/maroon   Drainage Amount Moderate (25-50%)   Drainage Description Serosanguinous   Odor None   Shayla-wound Assessment Ecchymosis;Fragile;Other (Comment)  (Herniated)   Wound Thickness Description not for Pressure Injury Full thickness     /63   Pulse 53   Temp 98.3 °F (36.8 °C) (Temporal)   Resp 18

## 2025-07-21 NOTE — FLOWSHEET NOTE
07/21/25 0843   Wound 07/07/25 Abdomen Left;Lower   Date First Assessed/Time First Assessed: 07/07/25 0832   Present on Original Admission: Yes  Wound Approximate Age at First Assessment (Weeks): 12 weeks  Primary Wound Type: (c) Other (Comment)  Location: Abdomen  Wound Location Orientation: Left;Lower   Dressing Status New dressing applied   Dressing/Treatment Xeroform;ABD

## 2025-07-21 NOTE — PROGRESS NOTES
Wound care    The patient is a 49-year-old man who was referred to the wound care center regarding a wound on the left anterior abdomen in the area of a ventral hernia.    The patient was first seen at the wound care center on 7/7/2025.    The patient reports that he scratched his abdominal wall in the area of his left anterior abdominal ventral hernia and developed a wound.    The patient has history of morbid obesity.  He had laparoscopic cholecystectomy approximately 4 years ago and developed a periumbilical hernia.  He had hernia repair utilizing intraperitoneal overlay mesh.  The patient has had recurrence of the hernia since that time and has extensive diastasis recti also.  The patient has been seen by Dr. Je Chaidez to evaluate for hernia repair.  The patient will need to lose 65 pounds prior to hernia repair.  He has been working on weight loss.    The patient does not have diabetes mellitus.  He does not report specific cardiac symptoms or have history of MI or coronary intervention.  He ambulates and does not describe dyspnea.    Past medical history is remarkable for hypertension.    As of 7/21/2025, the patient reports that he has some increasing pain at the wound site.      Dressing as of 7/7/2025: Cover wound with Xeroform then ABD and tape.  Change dressing daily.          Reported weight 365 pounds height 5 feet 8 inches    Physical examination    The patient is an alert man in no acute distress.    Examination of the patient's abdomen revealed on the anterior left lower abdomen in an area of protuberance related to herniation a wound cluster with total dimensions 3.5 x 6.4 x 0.1 cm with with pink surface and mild scabbing.        Impression:    Open wound left lower abdominal wall overlying ventral hernia.  The wound is persistent, not at goal (not healed).      Plan:    Dressing ordered: Cover wound with Xeroform then ABD and tape.  Change dressing daily.    Dressing can be removed for

## 2025-07-25 ENCOUNTER — CLINICAL DOCUMENTATION (OUTPATIENT)
Facility: HOSPITAL | Age: 50
End: 2025-07-25

## 2025-07-25 LAB
BACTERIA SPEC CULT: NORMAL
GRAM STN SPEC: NORMAL
GRAM STN SPEC: NORMAL
SERVICE CMNT-IMP: NORMAL

## 2025-08-04 ENCOUNTER — HOSPITAL ENCOUNTER (OUTPATIENT)
Facility: HOSPITAL | Age: 50
Discharge: HOME | End: 2025-08-04
Attending: SURGERY
Payer: COMMERCIAL

## 2025-08-04 VITALS
TEMPERATURE: 97.9 F | SYSTOLIC BLOOD PRESSURE: 137 MMHG | RESPIRATION RATE: 18 BRPM | HEART RATE: 53 BPM | DIASTOLIC BLOOD PRESSURE: 82 MMHG

## 2025-08-04 DIAGNOSIS — S31.109A OPEN WOUND OF ABDOMEN, INITIAL ENCOUNTER: Primary | ICD-10-CM

## 2025-08-04 PROCEDURE — 99213 OFFICE O/P EST LOW 20 MIN: CPT | Performed by: SURGERY

## 2025-08-04 PROCEDURE — 99213 OFFICE O/P EST LOW 20 MIN: CPT

## 2025-08-04 RX ORDER — LIDOCAINE HYDROCHLORIDE 20 MG/ML
JELLY TOPICAL PRN
OUTPATIENT
Start: 2025-08-04

## 2025-08-04 RX ORDER — NEOMYCIN/BACITRACIN/POLYMYXINB 3.5-400-5K
OINTMENT (GRAM) TOPICAL PRN
OUTPATIENT
Start: 2025-08-04

## 2025-08-04 RX ORDER — LIDOCAINE 50 MG/G
OINTMENT TOPICAL PRN
OUTPATIENT
Start: 2025-08-04

## 2025-08-04 RX ORDER — MUPIROCIN 2 %
OINTMENT (GRAM) TOPICAL PRN
OUTPATIENT
Start: 2025-08-04

## 2025-08-04 RX ORDER — BETAMETHASONE DIPROPIONATE 0.5 MG/G
CREAM TOPICAL PRN
OUTPATIENT
Start: 2025-08-04

## 2025-08-04 RX ORDER — LIDOCAINE 40 MG/G
CREAM TOPICAL PRN
OUTPATIENT
Start: 2025-08-04

## 2025-08-04 RX ORDER — TRIAMCINOLONE ACETONIDE 1 MG/G
OINTMENT TOPICAL PRN
OUTPATIENT
Start: 2025-08-04

## 2025-08-04 RX ORDER — CLOBETASOL PROPIONATE 0.5 MG/G
OINTMENT TOPICAL PRN
OUTPATIENT
Start: 2025-08-04

## 2025-08-04 RX ORDER — GENTAMICIN SULFATE 1 MG/G
OINTMENT TOPICAL PRN
OUTPATIENT
Start: 2025-08-04

## 2025-08-04 RX ORDER — GINSENG 100 MG
CAPSULE ORAL PRN
OUTPATIENT
Start: 2025-08-04

## 2025-08-04 RX ORDER — SODIUM CHLOR/HYPOCHLOROUS ACID 0.033 %
SOLUTION, IRRIGATION IRRIGATION PRN
OUTPATIENT
Start: 2025-08-04

## 2025-08-04 RX ORDER — SILVER SULFADIAZINE 10 MG/G
CREAM TOPICAL PRN
OUTPATIENT
Start: 2025-08-04

## 2025-08-04 RX ORDER — BACITRACIN ZINC AND POLYMYXIN B SULFATE 500; 1000 [USP'U]/G; [USP'U]/G
OINTMENT TOPICAL PRN
OUTPATIENT
Start: 2025-08-04

## 2025-08-04 RX ORDER — LIDOCAINE HYDROCHLORIDE 40 MG/ML
SOLUTION TOPICAL PRN
OUTPATIENT
Start: 2025-08-04

## 2025-08-04 ASSESSMENT — PAIN DESCRIPTION - LOCATION: LOCATION: ABDOMEN

## 2025-08-04 ASSESSMENT — PAIN DESCRIPTION - DESCRIPTORS: DESCRIPTORS: ACHING;DULL

## 2025-08-13 ENCOUNTER — OFFICE VISIT (OUTPATIENT)
Age: 50
End: 2025-08-13

## 2025-08-13 VITALS
HEIGHT: 68 IN | RESPIRATION RATE: 20 BRPM | TEMPERATURE: 97.9 F | HEART RATE: 58 BPM | BODY MASS INDEX: 47.74 KG/M2 | WEIGHT: 315 LBS | OXYGEN SATURATION: 98 % | SYSTOLIC BLOOD PRESSURE: 127 MMHG | DIASTOLIC BLOOD PRESSURE: 82 MMHG

## 2025-08-13 DIAGNOSIS — Z13.1 SCREENING FOR DIABETES MELLITUS (DM): ICD-10-CM

## 2025-08-13 DIAGNOSIS — E66.813 CLASS 3 SEVERE OBESITY DUE TO EXCESS CALORIES WITH SERIOUS COMORBIDITY AND BODY MASS INDEX (BMI) OF 50.0 TO 59.9 IN ADULT (HCC): Primary | ICD-10-CM

## 2025-08-13 DIAGNOSIS — Z13.29 SCREENING FOR HYPOTHYROIDISM: ICD-10-CM

## 2025-08-13 DIAGNOSIS — Z62.819 TRAUMA IN CHILDHOOD: ICD-10-CM

## 2025-08-13 DIAGNOSIS — E66.813 CLASS 3 SEVERE OBESITY DUE TO EXCESS CALORIES WITH SERIOUS COMORBIDITY AND BODY MASS INDEX (BMI) OF 50.0 TO 59.9 IN ADULT (HCC): ICD-10-CM

## 2025-08-13 DIAGNOSIS — Z13.220 SCREENING FOR HYPERCHOLESTEROLEMIA: ICD-10-CM

## 2025-08-13 DIAGNOSIS — I10 ESSENTIAL HYPERTENSION: ICD-10-CM

## 2025-08-13 LAB
COMMENT:: NORMAL
SPECIMEN HOLD: NORMAL

## 2025-08-13 RX ORDER — BUPROPION HYDROCHLORIDE 150 MG/1
150 TABLET, EXTENDED RELEASE ORAL 2 TIMES DAILY
Qty: 60 TABLET | Refills: 3 | Status: SHIPPED | OUTPATIENT
Start: 2025-08-13

## 2025-08-13 ASSESSMENT — PATIENT HEALTH QUESTIONNAIRE - PHQ9
SUM OF ALL RESPONSES TO PHQ QUESTIONS 1-9: 0
SUM OF ALL RESPONSES TO PHQ QUESTIONS 1-9: 0
1. LITTLE INTEREST OR PLEASURE IN DOING THINGS: NOT AT ALL
2. FEELING DOWN, DEPRESSED OR HOPELESS: NOT AT ALL
SUM OF ALL RESPONSES TO PHQ QUESTIONS 1-9: 0
SUM OF ALL RESPONSES TO PHQ QUESTIONS 1-9: 0

## 2025-08-14 ENCOUNTER — OFFICE VISIT (OUTPATIENT)
Age: 50
End: 2025-08-14

## 2025-08-14 DIAGNOSIS — E66.813 CLASS 3 SEVERE OBESITY DUE TO EXCESS CALORIES WITH SERIOUS COMORBIDITY AND BODY MASS INDEX (BMI) OF 50.0 TO 59.9 IN ADULT (HCC): Primary | ICD-10-CM

## 2025-08-14 LAB
ALBUMIN SERPL-MCNC: 3.7 G/DL (ref 3.5–5.2)
ALBUMIN/GLOB SERPL: 1 (ref 1.1–2.2)
ALP SERPL-CCNC: 75 U/L (ref 40–129)
ALT SERPL-CCNC: 29 U/L (ref 10–50)
ANION GAP SERPL CALC-SCNC: 14 MMOL/L (ref 2–14)
AST SERPL-CCNC: 24 U/L (ref 10–50)
BILIRUB SERPL-MCNC: 0.5 MG/DL (ref 0–1.2)
BUN SERPL-MCNC: 15 MG/DL (ref 6–20)
BUN/CREAT SERPL: 15 (ref 12–20)
CALCIUM SERPL-MCNC: 9.9 MG/DL (ref 8.6–10)
CHLORIDE SERPL-SCNC: 101 MMOL/L (ref 98–107)
CHOLEST SERPL-MCNC: 188 MG/DL (ref 0–200)
CO2 SERPL-SCNC: 24 MMOL/L (ref 20–29)
CREAT SERPL-MCNC: 1 MG/DL (ref 0.7–1.2)
EST. AVERAGE GLUCOSE BLD GHB EST-MCNC: 130 MG/DL
GLOBULIN SER CALC-MCNC: 3.8 G/DL (ref 2–4)
GLUCOSE SERPL-MCNC: 105 MG/DL (ref 65–100)
HBA1C MFR BLD: 6.2 % (ref 4–5.6)
HDLC SERPL-MCNC: 36 MG/DL (ref 40–60)
HDLC SERPL: 5.3 (ref 0–5)
LDLC SERPL CALC-MCNC: 133 MG/DL
POTASSIUM SERPL-SCNC: 4.9 MMOL/L (ref 3.5–5.1)
PROT SERPL-MCNC: 7.6 G/DL (ref 6.4–8.3)
SODIUM SERPL-SCNC: 139 MMOL/L (ref 136–145)
T4 FREE SERPL-MCNC: 1 NG/DL (ref 0.9–1.6)
TRIGL SERPL-MCNC: 97 MG/DL (ref 0–150)
TSH, 3RD GENERATION: 1.68 UIU/ML (ref 0.27–4.2)
VLDLC SERPL CALC-MCNC: 19 MG/DL

## 2025-08-25 ENCOUNTER — HOSPITAL ENCOUNTER (OUTPATIENT)
Facility: HOSPITAL | Age: 50
Discharge: HOME | End: 2025-08-25
Attending: SURGERY
Payer: COMMERCIAL

## 2025-08-25 VITALS
DIASTOLIC BLOOD PRESSURE: 67 MMHG | SYSTOLIC BLOOD PRESSURE: 139 MMHG | RESPIRATION RATE: 18 BRPM | TEMPERATURE: 97.7 F | HEART RATE: 61 BPM

## 2025-08-25 DIAGNOSIS — S31.109D OPEN WOUND OF ABDOMEN, SUBSEQUENT ENCOUNTER: Primary | ICD-10-CM

## 2025-08-25 DIAGNOSIS — S31.109A OPEN WOUND OF ABDOMEN, INITIAL ENCOUNTER: ICD-10-CM

## 2025-08-25 PROCEDURE — 87186 SC STD MICRODIL/AGAR DIL: CPT

## 2025-08-25 PROCEDURE — 87205 SMEAR GRAM STAIN: CPT

## 2025-08-25 PROCEDURE — 87077 CULTURE AEROBIC IDENTIFY: CPT

## 2025-08-25 PROCEDURE — 87070 CULTURE OTHR SPECIMN AEROBIC: CPT

## 2025-08-25 PROCEDURE — 99214 OFFICE O/P EST MOD 30 MIN: CPT | Performed by: SURGERY

## 2025-08-25 PROCEDURE — 99213 OFFICE O/P EST LOW 20 MIN: CPT

## 2025-08-25 RX ORDER — BETAMETHASONE DIPROPIONATE 0.5 MG/G
CREAM TOPICAL PRN
Status: CANCELLED | OUTPATIENT
Start: 2025-08-25

## 2025-08-25 RX ORDER — SILVER SULFADIAZINE 10 MG/G
CREAM TOPICAL PRN
Status: CANCELLED | OUTPATIENT
Start: 2025-08-25

## 2025-08-25 RX ORDER — MUPIROCIN 2 %
OINTMENT (GRAM) TOPICAL PRN
OUTPATIENT
Start: 2025-08-25

## 2025-08-25 RX ORDER — MUPIROCIN 2 %
OINTMENT (GRAM) TOPICAL PRN
Status: CANCELLED | OUTPATIENT
Start: 2025-08-25

## 2025-08-25 RX ORDER — LIDOCAINE 50 MG/G
OINTMENT TOPICAL PRN
OUTPATIENT
Start: 2025-08-25

## 2025-08-25 RX ORDER — NEOMYCIN/BACITRACIN/POLYMYXINB 3.5-400-5K
OINTMENT (GRAM) TOPICAL PRN
Status: CANCELLED | OUTPATIENT
Start: 2025-08-25

## 2025-08-25 RX ORDER — LIDOCAINE 40 MG/G
CREAM TOPICAL PRN
OUTPATIENT
Start: 2025-08-25

## 2025-08-25 RX ORDER — GINSENG 100 MG
CAPSULE ORAL PRN
Status: CANCELLED | OUTPATIENT
Start: 2025-08-25

## 2025-08-25 RX ORDER — SODIUM CHLOR/HYPOCHLOROUS ACID 0.033 %
SOLUTION, IRRIGATION IRRIGATION PRN
Status: CANCELLED | OUTPATIENT
Start: 2025-08-25

## 2025-08-25 RX ORDER — SILVER SULFADIAZINE 10 MG/G
CREAM TOPICAL PRN
OUTPATIENT
Start: 2025-08-25

## 2025-08-25 RX ORDER — BETAMETHASONE DIPROPIONATE 0.5 MG/G
CREAM TOPICAL PRN
OUTPATIENT
Start: 2025-08-25

## 2025-08-25 RX ORDER — CLOBETASOL PROPIONATE 0.5 MG/G
OINTMENT TOPICAL PRN
Status: CANCELLED | OUTPATIENT
Start: 2025-08-25

## 2025-08-25 RX ORDER — BACITRACIN ZINC AND POLYMYXIN B SULFATE 500; 1000 [USP'U]/G; [USP'U]/G
OINTMENT TOPICAL PRN
Status: CANCELLED | OUTPATIENT
Start: 2025-08-25

## 2025-08-25 RX ORDER — GENTAMICIN SULFATE 1 MG/G
OINTMENT TOPICAL PRN
OUTPATIENT
Start: 2025-08-25

## 2025-08-25 RX ORDER — LIDOCAINE 50 MG/G
OINTMENT TOPICAL PRN
Status: CANCELLED | OUTPATIENT
Start: 2025-08-25

## 2025-08-25 RX ORDER — NEOMYCIN/BACITRACIN/POLYMYXINB 3.5-400-5K
OINTMENT (GRAM) TOPICAL PRN
OUTPATIENT
Start: 2025-08-25

## 2025-08-25 RX ORDER — LIDOCAINE HYDROCHLORIDE 20 MG/ML
JELLY TOPICAL PRN
OUTPATIENT
Start: 2025-08-25

## 2025-08-25 RX ORDER — GENTAMICIN SULFATE 1 MG/G
OINTMENT TOPICAL PRN
Status: CANCELLED | OUTPATIENT
Start: 2025-08-25

## 2025-08-25 RX ORDER — TRIAMCINOLONE ACETONIDE 1 MG/G
OINTMENT TOPICAL PRN
Status: CANCELLED | OUTPATIENT
Start: 2025-08-25

## 2025-08-25 RX ORDER — LIDOCAINE HYDROCHLORIDE 40 MG/ML
SOLUTION TOPICAL PRN
Status: CANCELLED | OUTPATIENT
Start: 2025-08-25

## 2025-08-25 RX ORDER — LIDOCAINE HYDROCHLORIDE 20 MG/ML
JELLY TOPICAL PRN
Status: CANCELLED | OUTPATIENT
Start: 2025-08-25

## 2025-08-25 RX ORDER — LIDOCAINE 40 MG/G
CREAM TOPICAL PRN
Status: CANCELLED | OUTPATIENT
Start: 2025-08-25

## 2025-08-25 RX ORDER — LIDOCAINE HYDROCHLORIDE 40 MG/ML
SOLUTION TOPICAL PRN
OUTPATIENT
Start: 2025-08-25

## 2025-08-25 RX ORDER — CLOBETASOL PROPIONATE 0.5 MG/G
OINTMENT TOPICAL PRN
OUTPATIENT
Start: 2025-08-25

## 2025-08-25 RX ORDER — GINSENG 100 MG
CAPSULE ORAL PRN
OUTPATIENT
Start: 2025-08-25

## 2025-08-25 RX ORDER — BACITRACIN ZINC AND POLYMYXIN B SULFATE 500; 1000 [USP'U]/G; [USP'U]/G
OINTMENT TOPICAL PRN
OUTPATIENT
Start: 2025-08-25

## 2025-08-25 RX ORDER — TRIAMCINOLONE ACETONIDE 1 MG/G
OINTMENT TOPICAL PRN
OUTPATIENT
Start: 2025-08-25

## 2025-08-25 RX ORDER — SODIUM CHLOR/HYPOCHLOROUS ACID 0.033 %
SOLUTION, IRRIGATION IRRIGATION PRN
OUTPATIENT
Start: 2025-08-25

## 2025-08-25 ASSESSMENT — PAIN SCALES - GENERAL: PAINLEVEL_OUTOF10: 0

## 2025-08-28 ENCOUNTER — FOLLOWUP TELEPHONE ENCOUNTER (OUTPATIENT)
Facility: HOSPITAL | Age: 50
End: 2025-08-28

## 2025-08-28 LAB
BACTERIA SPEC CULT: ABNORMAL
BACTERIA SPEC CULT: ABNORMAL
GRAM STN SPEC: ABNORMAL
GRAM STN SPEC: ABNORMAL
SERVICE CMNT-IMP: ABNORMAL

## 2025-09-02 ENCOUNTER — ANESTHESIA EVENT (OUTPATIENT)
Facility: HOSPITAL | Age: 50
End: 2025-09-02
Payer: COMMERCIAL

## 2025-09-03 ENCOUNTER — ANESTHESIA (OUTPATIENT)
Facility: HOSPITAL | Age: 50
End: 2025-09-03
Payer: COMMERCIAL

## 2025-09-03 ENCOUNTER — HOSPITAL ENCOUNTER (OUTPATIENT)
Facility: HOSPITAL | Age: 50
Setting detail: OUTPATIENT SURGERY
Discharge: HOME | End: 2025-09-03
Attending: INTERNAL MEDICINE | Admitting: INTERNAL MEDICINE
Payer: COMMERCIAL

## 2025-09-03 VITALS
RESPIRATION RATE: 20 BRPM | OXYGEN SATURATION: 99 % | BODY MASS INDEX: 47.74 KG/M2 | TEMPERATURE: 98.5 F | HEIGHT: 68 IN | WEIGHT: 315 LBS | DIASTOLIC BLOOD PRESSURE: 73 MMHG | SYSTOLIC BLOOD PRESSURE: 139 MMHG | HEART RATE: 74 BPM

## 2025-09-03 PROCEDURE — 7100000011 HC PHASE II RECOVERY - ADDTL 15 MIN: Performed by: INTERNAL MEDICINE

## 2025-09-03 PROCEDURE — 7100000010 HC PHASE II RECOVERY - FIRST 15 MIN: Performed by: INTERNAL MEDICINE

## 2025-09-03 PROCEDURE — 88341 IMHCHEM/IMCYTCHM EA ADD ANTB: CPT

## 2025-09-03 PROCEDURE — 88342 IMHCHEM/IMCYTCHM 1ST ANTB: CPT

## 2025-09-03 PROCEDURE — 88360 TUMOR IMMUNOHISTOCHEM/MANUAL: CPT

## 2025-09-03 PROCEDURE — 3600007502: Performed by: INTERNAL MEDICINE

## 2025-09-03 PROCEDURE — 3600007512: Performed by: INTERNAL MEDICINE

## 2025-09-03 PROCEDURE — 3700000001 HC ADD 15 MINUTES (ANESTHESIA): Performed by: INTERNAL MEDICINE

## 2025-09-03 PROCEDURE — 2580000003 HC RX 258: Performed by: INTERNAL MEDICINE

## 2025-09-03 PROCEDURE — 3700000000 HC ANESTHESIA ATTENDED CARE: Performed by: INTERNAL MEDICINE

## 2025-09-03 PROCEDURE — 2709999900 HC NON-CHARGEABLE SUPPLY: Performed by: INTERNAL MEDICINE

## 2025-09-03 PROCEDURE — 88305 TISSUE EXAM BY PATHOLOGIST: CPT

## 2025-09-03 RX ORDER — PROPOFOL 10 MG/ML
INJECTION, EMULSION INTRAVENOUS
Status: DISCONTINUED | OUTPATIENT
Start: 2025-09-03 | End: 2025-09-03 | Stop reason: SDUPTHER

## 2025-09-03 RX ORDER — SODIUM CHLORIDE 0.9 % (FLUSH) 0.9 %
5-40 SYRINGE (ML) INJECTION PRN
Status: DISCONTINUED | OUTPATIENT
Start: 2025-09-03 | End: 2025-09-03 | Stop reason: HOSPADM

## 2025-09-03 RX ORDER — SODIUM CHLORIDE 9 MG/ML
INJECTION, SOLUTION INTRAVENOUS PRN
Status: DISCONTINUED | OUTPATIENT
Start: 2025-09-03 | End: 2025-09-03 | Stop reason: HOSPADM

## 2025-09-03 RX ORDER — SODIUM CHLORIDE 9 MG/ML
INJECTION, SOLUTION INTRAVENOUS CONTINUOUS
Status: DISCONTINUED | OUTPATIENT
Start: 2025-09-03 | End: 2025-09-03 | Stop reason: HOSPADM

## 2025-09-03 RX ORDER — ROCURONIUM BROMIDE 10 MG/ML
INJECTION, SOLUTION INTRAVENOUS
Status: DISCONTINUED | OUTPATIENT
Start: 2025-09-03 | End: 2025-09-03 | Stop reason: SDUPTHER

## 2025-09-03 RX ORDER — SUCCINYLCHOLINE/SOD CL,ISO/PF 200MG/10ML
SYRINGE (ML) INTRAVENOUS
Status: DISCONTINUED | OUTPATIENT
Start: 2025-09-03 | End: 2025-09-03 | Stop reason: SDUPTHER

## 2025-09-03 RX ORDER — PHENYLEPHRINE HCL IN 0.9% NACL 0.4MG/10ML
SYRINGE (ML) INTRAVENOUS
Status: DISCONTINUED | OUTPATIENT
Start: 2025-09-03 | End: 2025-09-03 | Stop reason: SDUPTHER

## 2025-09-03 RX ORDER — SODIUM CHLORIDE 0.9 % (FLUSH) 0.9 %
5-40 SYRINGE (ML) INJECTION EVERY 12 HOURS SCHEDULED
Status: DISCONTINUED | OUTPATIENT
Start: 2025-09-03 | End: 2025-09-03 | Stop reason: HOSPADM

## 2025-09-03 RX ORDER — LIDOCAINE HYDROCHLORIDE 20 MG/ML
INJECTION, SOLUTION EPIDURAL; INFILTRATION; INTRACAUDAL; PERINEURAL
Status: DISCONTINUED | OUTPATIENT
Start: 2025-09-03 | End: 2025-09-03 | Stop reason: SDUPTHER

## 2025-09-03 RX ADMIN — ROCURONIUM BROMIDE 5 MG: 10 INJECTION, SOLUTION INTRAVENOUS at 07:43

## 2025-09-03 RX ADMIN — Medication 80 MCG: at 08:27

## 2025-09-03 RX ADMIN — PROPOFOL 200 MG: 10 INJECTION, EMULSION INTRAVENOUS at 07:43

## 2025-09-03 RX ADMIN — Medication 80 MCG: at 08:15

## 2025-09-03 RX ADMIN — Medication 80 MCG: at 08:29

## 2025-09-03 RX ADMIN — PROPOFOL 100 MG: 10 INJECTION, EMULSION INTRAVENOUS at 07:44

## 2025-09-03 RX ADMIN — PROPOFOL 50 MG: 10 INJECTION, EMULSION INTRAVENOUS at 08:05

## 2025-09-03 RX ADMIN — Medication 80 MCG: at 07:53

## 2025-09-03 RX ADMIN — Medication 200 MG: at 07:44

## 2025-09-03 RX ADMIN — Medication 80 MCG: at 08:25

## 2025-09-03 RX ADMIN — LIDOCAINE HYDROCHLORIDE 60 MG: 20 INJECTION, SOLUTION EPIDURAL; INFILTRATION; INTRACAUDAL; PERINEURAL at 07:43

## 2025-09-03 RX ADMIN — ROCURONIUM BROMIDE 25 MG: 10 INJECTION, SOLUTION INTRAVENOUS at 07:48

## 2025-09-03 RX ADMIN — SODIUM CHLORIDE: 9 INJECTION, SOLUTION INTRAVENOUS at 08:08

## 2025-09-03 RX ADMIN — SODIUM CHLORIDE: 9 INJECTION, SOLUTION INTRAVENOUS at 07:19

## 2025-09-03 ASSESSMENT — PAIN - FUNCTIONAL ASSESSMENT: PAIN_FUNCTIONAL_ASSESSMENT: 0-10

## 2025-09-03 ASSESSMENT — PAIN SCALES - GENERAL
PAINLEVEL_OUTOF10: 0

## (undated) DEVICE — FORCEP BX LG CAP 2.4 MMX120 CM W/ NDL YEL RADIAL JAW 4 DISP

## (undated) DEVICE — SNARE ENDOSCP DIA9MM SHTH DIA2.4MM CLD FOR POLYP EXACTO

## (undated) DEVICE — TRAP SURG QUAD PARABOLA SLOT DSGN SFTY SCRN TRAPEASE